# Patient Record
Sex: FEMALE | Race: WHITE | URBAN - METROPOLITAN AREA
[De-identification: names, ages, dates, MRNs, and addresses within clinical notes are randomized per-mention and may not be internally consistent; named-entity substitution may affect disease eponyms.]

---

## 2017-05-25 ENCOUNTER — OFFICE VISIT (OUTPATIENT)
Dept: PEDIATRICS | Facility: CLINIC | Age: 47
End: 2017-05-25
Payer: COMMERCIAL

## 2017-05-25 VITALS
RESPIRATION RATE: 16 BRPM | HEIGHT: 65 IN | TEMPERATURE: 97.9 F | WEIGHT: 138.9 LBS | BODY MASS INDEX: 23.14 KG/M2 | SYSTOLIC BLOOD PRESSURE: 114 MMHG | OXYGEN SATURATION: 99 % | DIASTOLIC BLOOD PRESSURE: 68 MMHG | HEART RATE: 66 BPM

## 2017-05-25 DIAGNOSIS — J32.9 SINUSITIS, UNSPECIFIED CHRONICITY, UNSPECIFIED LOCATION: Primary | ICD-10-CM

## 2017-05-25 PROCEDURE — 99213 OFFICE O/P EST LOW 20 MIN: CPT | Performed by: NURSE PRACTITIONER

## 2017-05-25 RX ORDER — DOXYCYCLINE 100 MG/1
100 CAPSULE ORAL 2 TIMES DAILY
Qty: 14 CAPSULE | Refills: 0 | Status: SHIPPED | OUTPATIENT
Start: 2017-05-25 | End: 2017-06-01

## 2017-05-25 NOTE — PROGRESS NOTES
SUBJECTIVE:                                                    Jamaica Talbot is a 46 year old female who presents to clinic today for the following health issues:    Acute Illness   Acute illness concerns: possible sinus infection  Onset: since Saturday - worsening    Fever: no    Chills/Sweats: YES- chills    Headache (location?): YES    Sinus Pressure:YES    Conjunctivitis:  YES: both - watering, pressure    Ear Pain: YES: both - pressure    Rhinorrhea: YES    Congestion: YES    Sore Throat: YES - feels it is from drainage, coughing     Cough: YES-productive of yellow sputum    Wheeze: no    Decreased Appetite: YES    Nausea: no    Vomiting: no    Diarrhea:  no    Dysuria/Freq.: no    Fatigue/Achiness: YES- fatigue    Sick/Strep Exposure: no  Having difficulty sleeping   Therapies Tried and outcome: sudafed, vitamin C, advil, honey: Symptoms not alleviated    HPI: Jamaica presents today complaining of sinus pressure and pain, runny nose, post-nasal drip, sore throat, cough, sneeze, bilateral ear pressure, and itchy / watery eyes. She states that she began having these symptoms on May 20 and feels like they should have gone away by now.  She is rarely ill and reports having only mild seasonal allergies (mainly in the fall), for which she takes Zyrtec, which helps her symptoms. 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 She performs chores and cares for them in a barn.  The sinus pressure and pain occurs bilaterally inferior to her eyes, denying pain or pressure above the eyes. She also denies tooth pain or increased pain/pressure with bending. She states that she feels pressure within both ears, as well.  Her throat remains sore, which she attributes to constant drainage. The pain and pressure are constant and has been interfering with her sleep.  She has tried a decongestant and ibuprofen but reports minimal alleviation of symptoms.      ROS: const/heent/resp otherwise negative  "    OBJECTIVE:  /68 (Cuff Size: Adult Regular)  Pulse 66  Temp 97.9  F (36.6  C) (Tympanic)  Resp 16  Ht 5' 5\" (1.651 m)  Wt 138 lb 14.4 oz (63 kg)  SpO2 99%  BMI 23.11 kg/m2  CONSTITUTIONAL: Alert, well-nourished, well-groomed, NAD  RESP: Lungs CTA. No wheeze, rhonchi, rales.  CV: HRRR S1 S2 No MRG. No peripheral edema  HEENT: Eyes: Conjunctiva pink and moist. Ears: Ear canals unremarkable. TMs pearly gray bilaterally. Bony landmarks and light reflexes intact. No erythema. Nose: Turbinates pink and moist. Throat: OP pink and moist. No tonsillar enlargement or exudates. No postnasal drip.  Neck: No lymphadenopathy or masses. Thyroid smooth, non-tender, and non-enlarged.      ASSESSMENT/PLAN:  (J32.9) Sinusitis, unspecified chronicity, unspecified location  (primary encounter diagnosis)  Comment: Likely viral. However, patient is very frustrated with not receiving an antibiotic. Discussed OTC meds and printed an Rx for doxy (doesn't want augmentin due to side effects). Asked her not to fill it until sx have been ongoing for 10 days or more.   Plan: doxycycline (VIBRAMYCIN) 100 MG capsule        Discussed supportive cares and reasons to return. Discussed reasons to seek care urgently.         Celeste Vazquez, FNP-DNP.        "

## 2017-05-25 NOTE — MR AVS SNAPSHOT
"              After Visit Summary   2017    Jamaica Talbot    MRN: [id number]           Patient Information     Date Of Birth          1970        Visit Information        Provider Department      2017 10:20 AM Celeste Vazquez APRN CNP [facility]        Today's Diagnoses     Sinusitis, unspecified chronicity, unspecified location    -  1       Follow-ups after your visit        Who to contact     If you have questions or need follow up information about today's clinic visit or your schedule please contact [facility] directly at [phone].  Normal or non-critical lab and imaging results will be communicated to you by [employer], letter or phone within 4 business days after the clinic has received the results. If you do not hear from us within 7 days, please contact the clinic through [employer] or phone. If you have a critical or abnormal lab result, we will notify you by phone as soon as possible.  Submit refill requests through [employer] or call your pharmacy and they will forward the refill request to us. Please allow 3 business days for your refill to be completed.          Additional Information About Your Visit        MyChart Information     [employer] lets you send messages to your doctor, view your test results, renew your prescriptions, schedule appointments and more. To sign up, go to [email or web address] . Click on \"Log in\" on the left side of the screen, which will take you to the Welcome page. Then click on \"Sign up Now\" on the right side of the page.     You will be asked to enter the access code listed below, as well as some personal information. Please follow the directions to create your username and password.     Your access code is: [email or web address]  Expires: 2017 12:43 PM     Your access code will  in 90 days. If you need help or a new code, please call your [facility] or [phone].        Care EveryWhere ID     This is your Care " "EveryWhere ID. This could be used by other organizations to access your [facility] medical records  [id number]        Your Vitals Were     Pulse Temperature Respirations Height Pulse Oximetry BMI (Body Mass Index)    66 97.9  F (36.6  C) (Tympanic) 16 5' 5\" (1.651 m) 99% 23.11 kg/m2       Blood Pressure from Last 3 Encounters:   05/25/17 114/68   03/11/16 108/62   10/06/05 112/70    Weight from Last 3 Encounters:   05/25/17 138 lb 14.4 oz (63 kg)   03/11/16 140 lb 8 oz (63.7 kg)   01/27/05 160 lb (72.6 kg)              Today, you had the following     No orders found for display         Today's Medication Changes          These changes are accurate as of: 5/25/17 12:43 PM.  If you have any questions, ask your nurse or doctor.               Start taking these medicines.        Dose/Directions    doxycycline 100 MG capsule   Commonly known as:  VIBRAMYCIN   Used for:  Sinusitis, unspecified chronicity, unspecified location   Started by:  Celeste Vazquez APRN CNP        Dose:  100 mg   Take 1 capsule (100 mg) by mouth 2 times daily for 7 days   Quantity:  14 capsule   Refills:  0            Where to get your medicines      Some of these will need a paper prescription and others can be bought over the counter.  Ask your nurse if you have questions.     Bring a paper prescription for each of these medications     doxycycline 100 MG capsule                Primary Care Provider Office Phone # Fax #    Thaidoloreser Lennox Paul Balgobin, [phone]       [facility] [address] [facility] [address]        Thank you!     Thank you for choosing [facility]  for your care. Our goal is always to provide you with excellent care. Hearing back from our patients is one way we can continue to improve our services. Please take a few minutes to complete the written survey that you may receive in the mail after your visit with us. Thank you!             Your Updated " Medication List - Protect others around you: Learn how to safely use, store and throw away your medicines at www.disposemymeds.org.          This list is accurate as of: 5/25/17 12:43 PM.  Always use your most recent med list.                   Brand Name Dispense Instructions for use    doxycycline 100 MG capsule    VIBRAMYCIN    14 capsule    Take 1 capsule (100 mg) by mouth 2 times daily for 7 days       MULTIVITAMIN ADULT PO

## 2017-05-25 NOTE — NURSING NOTE
"Chief Complaint   Patient presents with     URI     possible sinus infect       Initial /68 (Cuff Size: Adult Regular)  Pulse 66  Temp 97.9  F (36.6  C) (Tympanic)  Resp 16  Ht 5' 5\" (1.651 m)  Wt 138 lb 14.4 oz (63 kg)  SpO2 99%  BMI 23.11 kg/m2 Estimated body mass index is 23.11 kg/(m^2) as calculated from the following:    Height as of this encounter: 5' 5\" (1.651 m).    Weight as of this encounter: 138 lb 14.4 oz (63 kg).  Medication Reconciliation: complete   Agnes Graham CMA    "

## 2017-05-30 ENCOUNTER — TELEPHONE (OUTPATIENT)
Dept: PEDIATRICS | Facility: CLINIC | Age: 47
End: 2017-05-30